# Patient Record
Sex: FEMALE | Race: WHITE | NOT HISPANIC OR LATINO | Employment: UNEMPLOYED | URBAN - NONMETROPOLITAN AREA
[De-identification: names, ages, dates, MRNs, and addresses within clinical notes are randomized per-mention and may not be internally consistent; named-entity substitution may affect disease eponyms.]

---

## 2017-02-13 ENCOUNTER — OFFICE VISIT - GICH (OUTPATIENT)
Dept: FAMILY MEDICINE | Facility: OTHER | Age: 47
End: 2017-02-13

## 2017-02-13 DIAGNOSIS — I10 ESSENTIAL (PRIMARY) HYPERTENSION: ICD-10-CM

## 2017-02-13 DIAGNOSIS — N94.9 UNSPECIFIED CONDITION ASSOCIATED WITH FEMALE GENITAL ORGANS AND MENSTRUAL CYCLE (CODE): ICD-10-CM

## 2017-07-19 ENCOUNTER — AMBULATORY - GICH (OUTPATIENT)
Dept: FAMILY MEDICINE | Facility: OTHER | Age: 47
End: 2017-07-19

## 2017-07-19 DIAGNOSIS — Z11.1 ENCOUNTER FOR SCREENING FOR RESPIRATORY TUBERCULOSIS: ICD-10-CM

## 2017-07-21 ENCOUNTER — AMBULATORY - GICH (OUTPATIENT)
Dept: FAMILY MEDICINE | Facility: OTHER | Age: 47
End: 2017-07-21

## 2017-07-26 ENCOUNTER — AMBULATORY - GICH (OUTPATIENT)
Dept: FAMILY MEDICINE | Facility: OTHER | Age: 47
End: 2017-07-26

## 2017-07-26 DIAGNOSIS — Z11.1 ENCOUNTER FOR SCREENING FOR RESPIRATORY TUBERCULOSIS: ICD-10-CM

## 2017-07-28 ENCOUNTER — AMBULATORY - GICH (OUTPATIENT)
Dept: FAMILY MEDICINE | Facility: OTHER | Age: 47
End: 2017-07-28

## 2017-08-15 ENCOUNTER — HISTORY (OUTPATIENT)
Dept: FAMILY MEDICINE | Facility: OTHER | Age: 47
End: 2017-08-15

## 2017-08-15 ENCOUNTER — OFFICE VISIT - GICH (OUTPATIENT)
Dept: FAMILY MEDICINE | Facility: OTHER | Age: 47
End: 2017-08-15

## 2017-08-15 DIAGNOSIS — I78.1 NEVUS, NON-NEOPLASTIC: ICD-10-CM

## 2017-08-15 DIAGNOSIS — I10 ESSENTIAL (PRIMARY) HYPERTENSION: ICD-10-CM

## 2017-08-23 ENCOUNTER — HISTORY (OUTPATIENT)
Dept: FAMILY MEDICINE | Facility: OTHER | Age: 47
End: 2017-08-23

## 2017-08-23 ENCOUNTER — OFFICE VISIT - GICH (OUTPATIENT)
Dept: FAMILY MEDICINE | Facility: OTHER | Age: 47
End: 2017-08-23

## 2017-08-23 DIAGNOSIS — N90.9 NONINFLAMMATORY DISORDER OF VULVA AND PERINEUM: ICD-10-CM

## 2017-10-02 ENCOUNTER — AMBULATORY - GICH (OUTPATIENT)
Dept: FAMILY MEDICINE | Facility: OTHER | Age: 47
End: 2017-10-02

## 2017-10-02 DIAGNOSIS — Z23 ENCOUNTER FOR IMMUNIZATION: ICD-10-CM

## 2017-12-27 NOTE — PROGRESS NOTES
"Patient Information     Patient Name MRN Geoff Wall [id number] Female 1970      Progress Notes by Alana Shrestha MD at 2017 10:15 AM     Author:  Alana Shrestha MD Service:  (none) Author Type:  Physician     Filed:  2017 10:40 AM Encounter Date:  2017 Status:  Signed     :  Alana Shrestha MD (Physician)            SUBJECTIVE:   Geoff Craven is a 47 y.o. female who would like a labial area cyst or lesion examined. Had one small one and may be a bit larger and now thinks there is one on both sides.  This area does get irritated on thong type underwear which she has been doing. She has also recently been working out quite a bit doing running and jogging, bike riding which is also going to be more irritating to the genital area. She does not shave the area. Last sexual encounter was 2 years ago and uses a condom. 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    OBJECTIVE:   /80  Pulse 78  Ht 1.63 m (5' 4.17\")  Wt 55.3 kg (122 lb)  LMP 2017  BMI 20.83 kg/m2  Appears well, alert, oriented, pleasant and cooperative.   External genital area reveals normal labia. The area she is questioning has only some hair follicles and no cysts or ulcers. She has one area that's inflamed and is likely an abraded hair follicle which was rubbed by her clothing and is about 3 mm in size.    ASSESSMENT:  1. Irritation of external female genitalia          PLAN:   Use topical Neosporin pain ointment twice daily to the area that's abraded on the left side.  Sitz baths recommended.  Stop wearing thong underwear which is causing inflammation and irritation in this area.  Reassured that there is nothing abnormal about her external genitalia.  Alana Shrestha MD  10:39 AM 2017             "

## 2017-12-28 NOTE — PATIENT INSTRUCTIONS
Patient Information     Patient Name MRN Geoff Wall [id number] Female 1970      Patient Instructions by Alana Shrestha MD at 8/15/2017  4:00 PM     Author:  Alana Shrestha MD Service:  (none) Author Type:  Physician     Filed:  8/15/2017  4:14 PM Encounter Date:  8/15/2017 Status:  Signed     :  Alana Shrestha MD (Physician)            Continue to check blood pressures as you come off of the medication.

## 2017-12-28 NOTE — PROGRESS NOTES
"Patient Information     Patient Name MRN Sex Geoff Aceves [id number] Female 1970      Progress Notes by Alana Shrestha MD at 8/15/2017  4:00 PM     Author:  Alana Shrestha MD Service:  (none) Author Type:  Physician     Filed:  8/15/2017  4:28 PM Encounter Date:  8/15/2017 Status:  Signed     :  Alana Shrestha MD (Physician)            Subjective:   Geoff Craven is a 47 y.o. female with hypertension who has now lost 20# and exercising regularly and avoiding refined sugar. She has tapered down on her BP medication to 25 mg a day.  She would like to trial off the medication. 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     Also has a skin lesion on left inner, upper leg is slightly vascular and she tends to shave it off with her razor when she is shaving her legs. She would like it removed if possible. She has no other vascular lesions.    Current Outpatient Prescriptions       Medication  Sig Dispense Refill     metoprolol tartrate (LOPRESSOR) 50 mg tablet Take 1 tablet by mouth 2 times daily. 90 tablet 4     miscellaneous medical supply misc As directed. OMRON or equivalent blood pressure cuff/arm to fit arm 1 Units 0     No current facility-administered medications for this visit.      Medications have been reviewed by me and are current to the best of my knowledge and ability.     Hypertension ROS: taking medications as instructed, no medication side effects noted, no TIA's, no chest pain on exertion, no dyspnea on exertion, no swelling of ankles.     Objective:   /74  Pulse 64  Ht 1.63 m (5' 4.17\")  Wt 57.7 kg (127 lb 4 oz)  LMP 2017  Breastfeeding? No  BMI 21.73 kg/m2   Appearance healthy, alert and cooperative.  General exam BP noted to be well controlled today in office.   2 mm lesion left upper inner leg is a cherry angioma.  Lab review: labs are reviewed, up to date and normal.     Assessment:    1. Hypertension    2. 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 Follow up if not controlled.  Continue diet changes and exercise.  Reassured about skin lesion, difficult to eradicate and benign.  Alana Shrestha MD  4:28 PM 8/15/2017

## 2017-12-28 NOTE — PROGRESS NOTES
Patient Information     Patient Name MRN Geoff Wall [id number] Female 1970      Progress Notes by Lizzy Sánchez RN at 2017 10:16 AM     Author:  Lizzy Sánchez RN Service:  (none) Author Type:  NURS- Registered Nurse     Filed:  2017 10:23 AM Encounter Date:  2017 Status:  Signed     :  Lizzy Sánchez RN (NURS- Registered Nurse)              Mantoux test applied for the following purpose: School request    Do NOT apply PPD if patient answers 'yes' to any of the questions below.  Contact their Primary Care Provider for further instructions.       Patient History  Previous TB: no  Previous positive PPD: no  History of 'live' vaccines such as MMRV or Proquad (Measles, Mumps, Rubella, Varicella), Shingles (Zostavax), Intranasal flu (FLumist, LAIV), Rotavirus (Rotateq) in the last 4 weeks: no  HIV or immunocompromised: no  Significant weight loss (10 lbs or greater) in the past 6 months: yes chose to loose wait  Currently or recently experiencing night sweats: no  Currently experiencing a persistent cough: no    The patient has been informed to return to this clinic within 48-72 hours to read PPD.  Above information obtained by: Eddie BELTRAN

## 2017-12-28 NOTE — PROGRESS NOTES
Patient Information     Patient Name MRN Geoff Wall [id number] Female 1970      Progress Notes by Shefali Murphy at 2017  1:08 PM     Author:  Shefali Murphy Service:  (none) Author Type:  NURS- Student Nurse     Filed:  2017  1:14 PM Encounter Date:  2017 Status:  Signed     :  Shefali Murphy (NURS- Student Nurse)              Mantoux test applied for the following purpose: School request    Do NOT apply PPD if patient answers 'yes' to any of the questions below.  Contact their Primary Care Provider for further instructions.       Patient History  Previous TB: no  Previous positive PPD: no  History of 'live' vaccines such as MMRV or Proquad (Measles, Mumps, Rubella, Varicella), Shingles (Zostavax), Intranasal flu (FLumist, LAIV), Rotavirus (Rotateq) in the last 4 weeks: no  HIV or immunocompromised: no  Significant weight loss (10 lbs or greater) in the past 6 months: yes, by choice  Currently or recently experiencing night sweats: no  Currently experiencing a persistent cough: no    The patient has been informed to return to this clinic within 48-72 hours to read PPD.  Above information obtained by: Shefali Murphy RN ....................  2017   1:11 PM

## 2017-12-29 NOTE — PATIENT INSTRUCTIONS
Patient Information     Patient Name MRN Geoff Wall [id number] Female 1970      Patient Instructions by Alana Shrestha MD at 2017 10:15 AM     Author:  Alana Shrestha MD Service:  (none) Author Type:  Physician     Filed:  2017 10:33 AM Encounter Date:  2017 Status:  Signed     :  Alana Shrestha MD (Physician)            Apply neosporin pain ointment or cream to area twice a day.   Take a bath intermittently.

## 2017-12-30 NOTE — NURSING NOTE
Patient Information     Patient Name MRN Geoff Wall [id number] Female 1970      Nursing Note by Shefali Murphy at 2017 11:00 AM     Author:  Shefali Murphy Service:  (none) Author Type:  NURS- Student Nurse     Filed:  2017 11:10 AM Encounter Date:  2017 Status:  Signed     :  Shefali Murphy (NURS- Student Nurse)            Mantoux placed on 17 to the left forearm at 1020 AM. Read today on 17 0mm, negative. Read at 1109 AM. Shefali Murphy RN ....................  2017   11:09 AM

## 2017-12-30 NOTE — NURSING NOTE
Patient Information     Patient Name MRN Geoff Wall [id number] Female 1970      Nursing Note by Minnie Hill at 8/15/2017  4:00 PM     Author:  Minnie Hill Service:  (none) Author Type:  (none)     Filed:  8/15/2017  4:06 PM Encounter Date:  8/15/2017 Status:  Signed     :  Minnie Hill            Patient presents today to discuss bp medication. Minnie Hill LPN...............8/15/2017   4:00 PM

## 2017-12-30 NOTE — NURSING NOTE
Patient Information     Patient Name MRN Geoff Wall [id number] Female 1970      Nursing Note by Deann Lamb RN at 2017  1:45 PM     Author:  Deann Lamb RN Service:  (none) Author Type:  NURS- Registered Nurse     Filed:  2017  1:58 PM Encounter Date:  2017 Status:  Signed     :  Deann Lamb RN (NURS- Registered Nurse)            t had a mantoux placed on 17 at 110 pm in left forearm and returns today 17 at 155 pm to have this read as neg 0.0 mm. Copy of this given on request. DEANN LAMB RN ....................  2017   1:58 PM

## 2018-01-03 NOTE — PATIENT INSTRUCTIONS
Patient Information     Patient Name MRN Geoff Wall [id number] Female 1970      Patient Instructions by Alana Shrestha MD at 2017  2:00 PM     Author:  Alana Shrestha MD Service:  (none) Author Type:  Physician     Filed:  2017  2:18 PM Encounter Date:  2017 Status:  Signed     :  Alana Shrestha MD (Physician)            Use Neosporin pain to area 2-3 times a day until healed.

## 2018-01-25 VITALS
WEIGHT: 127.25 LBS | BODY MASS INDEX: 21.72 KG/M2 | SYSTOLIC BLOOD PRESSURE: 132 MMHG | DIASTOLIC BLOOD PRESSURE: 74 MMHG | HEART RATE: 64 BPM | HEIGHT: 64 IN

## 2018-01-25 VITALS
SYSTOLIC BLOOD PRESSURE: 140 MMHG | BODY MASS INDEX: 25.78 KG/M2 | DIASTOLIC BLOOD PRESSURE: 90 MMHG | HEIGHT: 64 IN | WEIGHT: 151 LBS | HEART RATE: 62 BPM

## 2018-01-25 VITALS
DIASTOLIC BLOOD PRESSURE: 80 MMHG | HEIGHT: 64 IN | BODY MASS INDEX: 20.83 KG/M2 | WEIGHT: 122 LBS | SYSTOLIC BLOOD PRESSURE: 130 MMHG | HEART RATE: 78 BPM

## 2018-02-25 ENCOUNTER — DOCUMENTATION ONLY (OUTPATIENT)
Dept: FAMILY MEDICINE | Facility: OTHER | Age: 48
End: 2018-02-25

## 2018-02-25 PROBLEM — J30.1 ALLERGIC RHINITIS DUE TO POLLEN: Status: ACTIVE | Noted: 2018-02-25

## 2018-02-25 RX ORDER — BLOOD PRESSURE TEST KIT-WRIST
KIT MISCELLANEOUS
COMMUNITY
Start: 2016-10-20

## 2018-04-06 ENCOUNTER — OFFICE VISIT (OUTPATIENT)
Dept: OBGYN | Facility: OTHER | Age: 48
End: 2018-04-06
Attending: OBSTETRICS & GYNECOLOGY
Payer: COMMERCIAL

## 2018-04-06 VITALS
SYSTOLIC BLOOD PRESSURE: 160 MMHG | HEART RATE: 62 BPM | DIASTOLIC BLOOD PRESSURE: 80 MMHG | WEIGHT: 132.4 LBS | BODY MASS INDEX: 22.61 KG/M2 | HEIGHT: 64 IN

## 2018-04-06 DIAGNOSIS — N90.89 LABIAL LESION: Primary | ICD-10-CM

## 2018-04-06 PROCEDURE — 99203 OFFICE O/P NEW LOW 30 MIN: CPT | Performed by: OBSTETRICS & GYNECOLOGY

## 2018-04-06 PROCEDURE — G0463 HOSPITAL OUTPT CLINIC VISIT: HCPCS

## 2018-04-06 ASSESSMENT — PAIN SCALES - GENERAL: PAINLEVEL: NO PAIN (0)

## 2018-04-06 NOTE — MR AVS SNAPSHOT
"              After Visit Summary   2018    Geoff Craven    MRN: [id number]           Patient Information     Date Of Birth          1970        Visit Information        Provider Department      2018 1:45 PM Nanci Zheng MD [facility]        Today's Diagnoses     Labial lesion    -  1       Follow-ups after your visit        Who to contact     If you have questions or need follow up information about today's clinic visit or your schedule please contact [facility] directly at [phone].  Normal or non-critical lab and imaging results will be communicated to you by JumpTimehart, letter or phone within 4 business days after the clinic has received the results. If you do not hear from us within 7 days, please contact the clinic through Fluidigmt or phone. If you have a critical or abnormal lab result, we will notify you by phone as soon as possible.  Submit refill requests through Pryv or call your pharmacy and they will forward the refill request to us. Please allow 3 business days for your refill to be completed.          Additional Information About Your Visit        MyChart Information     Pryv lets you send messages to your doctor, view your test results, renew your prescriptions, schedule appointments and more. To sign up, go to [email or web address] . Click on \"Log in\" on the left side of the screen, which will take you to the Welcome page. Then click on \"Sign up Now\" on the right side of the page.     You will be asked to enter the access code listed below, as well as some personal information. Please follow the directions to create your username and password.     Your access code is: [email or web address]  Expires: 2018  4:47 PM     Your access code will  in 90 days. If you need help or a new code, please call your [place] clinic or [phone].        Care EveryWhere ID     This is your Care EveryWhere ID. This could be used by other " "organizations to access your [facility] medical records  [id number]        Your Vitals Were     Pulse Height Last Period Breastfeeding? BMI (Body Mass Index)       62 1.626 m (5' 4\") 03/08/2018 (Approximate) No 22.73 kg/m2        Blood Pressure from Last 3 Encounters:   04/06/18 160/80   08/23/17 130/80   08/15/17 132/74    Weight from Last 3 Encounters:   04/06/18 60.1 kg (132 lb 6.4 oz)   08/23/17 55.3 kg (122 lb)   08/15/17 57.7 kg (127 lb 4 oz)              Today, you had the following     No orders found for display       Primary Care Provider Office Phone # Fax #    Alana Shrestha [phone]       [address] [facility] [address]        Equal Access to Services     [facility]: Hadii olinda marroquin Sozen, waaxda luqadaha, qaybta kaalmada adekobeyada, elbert adan . So [facility] [phone].    ATENCIÓN: Si habla español, tiene a dexter disposición servicios gratuitos de asistencia lingüística. Llame al [phone].    We comply with applicable federal civil rights laws and [place] laws. We do not discriminate on the basis of race, color, national origin, age, disability, sex, sexual orientation, or gender identity.            Thank you!     Thank you for choosing [facility] AND [facility]  for your care. Our goal is always to provide you with excellent care. Hearing back from our patients is one way we can continue to improve our services. Please take a few minutes to complete the written survey that you may receive in the mail after your visit with us. Thank you!             Your Updated Medication List - Protect others around you: Learn how to safely use, store and throw away your medicines at www.disposemymeds.org.          This list is accurate as of 4/6/18  4:47 PM.  Always use your most recent med list.                   Brand Name Dispense Instructions for use Diagnosis    OMRON 7 SERIES BP MONITOR Bela      As directed. OMRON or " equivalent blood pressure cuff/arm to fit arm

## 2018-04-06 NOTE — NURSING NOTE
Patient has sore area in left labia/groin area that comes and goes. Not present today.    Rosemarie Allen RN...................4/6/2018 1:53 PM

## 2018-04-06 NOTE — LETTER
4/6/2018       RE: Geoff Craven  [address]  [place] [address]     Dear Colleague,    Thank you for referring your patient, Geoff Craven, to the [facility] at [facility]. Please see a copy of my visit note below.    Gynecology Visit    CC: labial sore    HPI:    Geoff Craven is a 47 year old G0 here for recurrent sores on her labia. She reports 4 total flares in the past 6 months of a raised, extremely painful lesion on the medial aspect of her left labia.  It always recurs in the same location. It will last 4-5 days and spontaneously resolve. It does not look like a blister or ulcer. It is not erythematous and does not drain. She has not been sexually active for the past two years and has never been diagnosed with HSV. 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 Had a cone biopsy and reports all normal paps since. Last 6/2016 NILM.    PMHx:   Past Medical History:   Diagnosis Date     Bronchitis     No Comments Provided     Personal history of other medical treatment (CODE)     1991,status post cone biopsy.     Pneumonia     No Comments Provided      PSHx:   Past Surgical History:   Procedure Laterality Date     EXTRACTION(S) DENTAL      Only had one     OTHER SURGICAL HISTORY      1991,10885.0,[place] CONIZATION CERVIX KNIFE/LASER      Meds:   Current Outpatient Prescriptions on File Prior to Visit:  Blood Pressure Monitoring (OMRON 7 SERIES BP MONITOR) GRETCHEN As directed. OMRON or equivalent blood pressure cuff/arm to fit arm     No current facility-administered medications on file prior to visit.     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 Graduates in  "May    FamHx:   Family History   Problem Relation Age of Onset     Breast Cancer Other      Cancer-breast     DIABETES Other      Diabetes     Other - See Comments Other      Psychiatric illness, anxiety     Colon Cancer Maternal Aunt      Cancer-colon,In her 50's     DIABETES Father      Diabetes     Hyperlipidemia Father      Hyperlipidemia,High cholesterol     HEART DISEASE Other      Heart Disease,Several grandparents, unknown type     Hyperlipidemia Mother      Hyperlipidemia      Physical Exam  /80 (BP Location: Right arm, Patient Position: Sitting, Cuff Size: Adult Regular)  Pulse 62  Ht 1.626 m (5' 4\")  Wt 60.1 kg (132 lb 6.4 oz)  LMP 03/08/2018 (Approximate)  Breastfeeding? No  BMI 22.73 kg/m2  Gen: Well-appearing, NAD  Resp: nonlabored  Psych: appropriate mood and affect    Pelvic:  Normal appearing external female genitalia. Normal hair distribution. There are no abnormal or raised lesions on the inner aspect of her labia. Outlined area of discomfort is at 5-6 o'clock of posterior forchette.      Assessment/Plan  Geoff Craven is a 47 year old G0 female here for recurrent labia sores. She brought in a picture today- story sounds consistent with possible HSV but images on phone do not look as convincing. It is not ulcerated as would be expected with Hemophilus ducreyi or syphilis. Location is not consistent with a bartholin cyst. Recommend she RTC when she is having symptoms so an exam can be done. She was instructed to call at onset of symptoms so she can be worked in for a same or next day appointment.    30 minutes were spent with the patient with >50% spent counseling on vuvlar lesions.      Nanci Zheng MD  OB/GYN  4/6/2018 4:11 PM      "

## 2018-04-06 NOTE — PROGRESS NOTES
Gynecology Visit    CC: labial sore    HPI:    Geoff Craven is a 47 year old G0 here for recurrent sores on her labia. She reports 4 total flares in the past 6 months of a raised, extremely painful lesion on the medial aspect of her left labia.  It always recurs in the same location. It will last 4-5 days and spontaneously resolve. It does not look like a blister or ulcer. It is not erythematous and does not drain. She has not been sexually active for the past two years and has never been diagnosed with HSV. 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 Had a cone biopsy and reports all normal paps since. Last 6/2016 NILM.    PMHx:   Past Medical History:   Diagnosis Date     Bronchitis     No Comments Provided     Personal history of other medical treatment (CODE)     1991,status post cone biopsy.     Pneumonia     No Comments Provided      PSHx:   Past Surgical History:   Procedure Laterality Date     EXTRACTION(S) DENTAL      Only had one     OTHER SURGICAL HISTORY      1991,50398.0,[place] CONIZATION CERVIX KNIFE/LASER      Meds:   Current Outpatient Prescriptions on File Prior to Visit:  Blood Pressure Monitoring (OMRON 7 SERIES BP MONITOR) GRETCHEN As directed. OMRON or equivalent blood pressure cuff/arm to fit arm     No current facility-administered medications on file prior to visit.     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    FamHx:   Family History   Problem Relation Age of Onset     Breast Cancer Other      Cancer-breast     DIABETES Other      Diabetes     Other - See Comments Other      Psychiatric illness, anxiety     Colon Cancer Maternal Aunt      Cancer-colon,In her 50's      "DIABETES Father      Diabetes     Hyperlipidemia Father      Hyperlipidemia,High cholesterol     HEART DISEASE Other      Heart Disease,Several grandparents, unknown type     Hyperlipidemia Mother      Hyperlipidemia      Physical Exam  /80 (BP Location: Right arm, Patient Position: Sitting, Cuff Size: Adult Regular)  Pulse 62  Ht 1.626 m (5' 4\")  Wt 60.1 kg (132 lb 6.4 oz)  LMP 03/08/2018 (Approximate)  Breastfeeding? No  BMI 22.73 kg/m2  Gen: Well-appearing, NAD  Resp: nonlabored  Psych: appropriate mood and affect    Pelvic:  Normal appearing external female genitalia. Normal hair distribution. There are no abnormal or raised lesions on the inner aspect of her labia. Outlined area of discomfort is at 5-6 o'clock of posterior forchette.      Assessment/Plan  Geoff Craven is a 47 year old G0 female here for recurrent labia sores. She brought in a picture today- story sounds consistent with possible HSV but images on phone do not look as convincing. It is not ulcerated as would be expected with Hemophilus ducreyi or syphilis. Location is not consistent with a bartholin cyst. Recommend she RTC when she is having symptoms so an exam can be done. She was instructed to call at onset of symptoms so she can be worked in for a same or next day appointment.    30 minutes were spent with the patient with >50% spent counseling on vuvlar lesions.      Nanci Zheng MD  OB/GYN  4/6/2018 4:11 PM      "

## 2018-04-18 ENCOUNTER — OFFICE VISIT (OUTPATIENT)
Dept: OBGYN | Facility: OTHER | Age: 48
End: 2018-04-18
Attending: OBSTETRICS & GYNECOLOGY
Payer: COMMERCIAL

## 2018-04-18 VITALS
HEART RATE: 60 BPM | SYSTOLIC BLOOD PRESSURE: 130 MMHG | WEIGHT: 132.9 LBS | DIASTOLIC BLOOD PRESSURE: 84 MMHG | BODY MASS INDEX: 22.81 KG/M2

## 2018-04-18 DIAGNOSIS — N90.89 VULVAR LESION: Primary | ICD-10-CM

## 2018-04-18 PROCEDURE — 99213 OFFICE O/P EST LOW 20 MIN: CPT | Performed by: OBSTETRICS & GYNECOLOGY

## 2018-04-18 PROCEDURE — G0463 HOSPITAL OUTPT CLINIC VISIT: HCPCS

## 2018-04-18 PROCEDURE — 87529 HSV DNA AMP PROBE: CPT | Performed by: OBSTETRICS & GYNECOLOGY

## 2018-04-18 RX ORDER — ACYCLOVIR 400 MG/1
400 TABLET ORAL 3 TIMES DAILY
Qty: 15 TABLET | Refills: 0 | Status: SHIPPED | OUTPATIENT
Start: 2018-04-18

## 2018-04-18 ASSESSMENT — PAIN SCALES - GENERAL: PAINLEVEL: SEVERE PAIN (6)

## 2018-04-18 NOTE — NURSING NOTE
Patient presents for follow up of labial lesion.    Rosemarie Allen RN...................4/18/2018 9:56 AM

## 2018-04-18 NOTE — PROGRESS NOTES
Follow-Up Visit    S: Ms. Geoff Craven is a 47 year old here for f/u of vulvar lesions. She reports two days ago she started feeling some pressure on her perineum in the area of the irritation. It became more painful yesterday and is worse today. She looked at that area and thinks there is some skin breakdown. She has noticed a pattern that she seems to get these symptoms after her menses.    O:  /84 (BP Location: Right arm, Patient Position: Sitting, Cuff Size: Adult Regular)  Pulse 60  Wt 60.3 kg (132 lb 14.4 oz)  LMP 04/12/2018 (Exact Date)  Breastfeeding? No  BMI 22.81 kg/m2  Gen: Well-appearing, NAD  Pulm: nonlabored  Psych: appropriate mood and affect    Pelvic: Two small (2-3mm) superficial tender ulcerated lesions on the left perineum just posterior to the posterior forchette.    A/P:  Ms. Geoff Craven is a 47 year old  here for recurrent vulvar lesions. Lesions are not classic for herpes but suspicious, especially given her story. HSV swab collected today. Will call patient with results. Will do trial of acyclovir and topical lidocaine gel. Given frequency of symptoms, if HSV is confirmed would recommend suppressive therapy    Nanci Zheng MD  OB/GYN  4/18/2018 11:42 AM

## 2018-04-18 NOTE — MR AVS SNAPSHOT
"              After Visit Summary   2018    Geoff Craven    MRN: [id number]           Patient Information     Date Of Birth          1970        Visit Information        Provider Department      2018 10:00 AM Nanci Zheng MD [facility]        Today's Diagnoses     Vulvar lesion    -  1       Follow-ups after your visit        Who to contact     If you have questions or need follow up information about today's clinic visit or your schedule please contact [facility] directly at [phone].  Normal or non-critical lab and imaging results will be communicated to you by Lozohart, letter or phone within 4 business days after the clinic has received the results. If you do not hear from us within 7 days, please contact the clinic through Neurosearcht or phone. If you have a critical or abnormal lab result, we will notify you by phone as soon as possible.  Submit refill requests through YouFolio or call your pharmacy and they will forward the refill request to us. Please allow 3 business days for your refill to be completed.          Additional Information About Your Visit        MyChart Information     YouFolio lets you send messages to your doctor, view your test results, renew your prescriptions, schedule appointments and more. To sign up, go to [email or web address] . Click on \"Log in\" on the left side of the screen, which will take you to the Welcome page. Then click on \"Sign up Now\" on the right side of the page.     You will be asked to enter the access code listed below, as well as some personal information. Please follow the directions to create your username and password.     Your access code is: [email or web address]  Expires: 2018  4:47 PM     Your access code will  in 90 days. If you need help or a new code, please call your [place] clinic or [phone].        Care EveryWhere ID     This is your Care EveryWhere ID. This could be used by other " organizations to access your [facility] medical records  [id number]        Your Vitals Were     Pulse Last Period Breastfeeding? BMI (Body Mass Index)          60 04/12/2018 (Exact Date) No 22.81 kg/m2         Blood Pressure from Last 3 Encounters:   04/18/18 130/84   04/06/18 160/80   08/23/17 130/80    Weight from Last 3 Encounters:   04/18/18 60.3 kg (132 lb 14.4 oz)   04/06/18 60.1 kg (132 lb 6.4 oz)   08/23/17 55.3 kg (122 lb)              We Performed the Following     Herpes Simplex Virus 1 and 2 IgG     HSV 1 and 2 DNA by PCR          Today's Medication Changes          These changes are accurate as of 4/18/18 11:47 AM.  If you have any questions, ask your nurse or doctor.               Start taking these medicines.        Dose/Directions    acyclovir 400 MG tablet   Commonly known as:  ZOVIRAX   Used for:  Vulvar lesion   Started by:  Nanci Zheng MD        Dose:  400 mg   Take 1 tablet (400 mg) by mouth 3 times daily   Quantity:  15 tablet   Refills:  0       lidocaine 2 % topical gel   Commonly known as:  XYLOCAINE   Used for:  Vulvar lesion   Started by:  Nanci Zheng MD        Apply topically as needed for moderate pain   Quantity:  30 mL   Refills:  0            Where to get your medicines      These medications were sent to [facility] - [place], [place] - [address]  [address], [facility] [address]     Phone:  [phone]     acyclovir 400 MG tablet    lidocaine 2 % topical gel                Primary Care Provider Office Phone # Fax #    Aalna Lissette Shrestha [phone]       [address] [facility] [address]        Equal Access to Services     [facility] Hospital[facility]ARCEL AH: Hadii olinda Rosales, wajhonathanda luqadaha, qaybta kaalmada adeegyada, elbert mendez. So [facility] [phone].    ATENCIÓN: Si habla español, tiene a dexter disposición servicios gratuitos de asistencia lingüística. Llame al  [phone].    We comply with applicable federal civil rights laws and [place] laws. We do not discriminate on the basis of race, color, national origin, age, disability, sex, sexual orientation, or gender identity.            Thank you!     Thank you for choosing [facility] AND [facility]  for your care. Our goal is always to provide you with excellent care. Hearing back from our patients is one way we can continue to improve our services. Please take a few minutes to complete the written survey that you may receive in the mail after your visit with us. Thank you!             Your Updated Medication List - Protect others around you: Learn how to safely use, store and throw away your medicines at www.disposemymeds.org.          This list is accurate as of 4/18/18 11:47 AM.  Always use your most recent med list.                   Brand Name Dispense Instructions for use Diagnosis    acyclovir 400 MG tablet    ZOVIRAX    15 tablet    Take 1 tablet (400 mg) by mouth 3 times daily    Vulvar lesion       lidocaine 2 % topical gel    XYLOCAINE    30 mL    Apply topically as needed for moderate pain    Vulvar lesion       OMRON 7 SERIES BP MONITOR Bela      As directed. OMRON or equivalent blood pressure cuff/arm to fit arm

## 2018-04-18 NOTE — LETTER
4/18/2018       RE: Geoff Craven  [address]  [facility] [address]     Dear Colleague,    Thank you for referring your patient, Geoff Craven, to the [facility]. Please see a copy of my visit note below.    Follow-Up Visit    S: Ms. Geoff Craven is a 47 year old here for f/u of vulvar lesions. She reports two days ago she started feeling some pressure on her perineum in the area of the irritation. It became more painful yesterday and is worse today. She looked at that area and thinks there is some skin breakdown. She has noticed a pattern that she seems to get these symptoms after her menses.    O:  /84 (BP Location: Right arm, Patient Position: Sitting, Cuff Size: Adult Regular)  Pulse 60  Wt 60.3 kg (132 lb 14.4 oz)  LMP 04/12/2018 (Exact Date)  Breastfeeding? No  BMI 22.81 kg/m2  Gen: Well-appearing, NAD  Pulm: nonlabored  Psych: appropriate mood and affect    Pelvic: Two small (2-3mm) superficial tender ulcerated lesions on the left perineum just posterior to the posterior forchette.    A/P:  Ms. Geoff Craven is a 47 year old  here for recurrent vulvar lesions. Lesions are not classic for herpes but suspicious, especially given her story. HSV swab collected today. Will call patient with results. Will do trial of acyclovir and topical lidocaine gel. Given frequency of symptoms, if HSV is confirmed would recommend suppressive therapy    Nanci Zheng MD  OB/GYN  4/18/2018 11:42 AM

## 2018-04-19 LAB
HSV1 DNA SPEC QL NAA+PROBE: NEGATIVE
HSV2 DNA SPEC QL NAA+PROBE: POSITIVE
SPECIMEN SOURCE: ABNORMAL

## 2018-04-20 ENCOUNTER — TELEPHONE (OUTPATIENT)
Dept: OBGYN | Facility: OTHER | Age: 48
End: 2018-04-20

## 2018-04-20 DIAGNOSIS — B00.9 HSV (HERPES SIMPLEX VIRUS) INFECTION: Primary | ICD-10-CM

## 2018-04-20 RX ORDER — VALACYCLOVIR HYDROCHLORIDE 500 MG/1
500 TABLET, FILM COATED ORAL DAILY
Qty: 90 TABLET | Refills: 3 | Status: SHIPPED | OUTPATIENT
Start: 2018-04-20

## 2018-04-20 NOTE — TELEPHONE ENCOUNTER
Patient notified of HSV swab results and suppressive therapy ordered per verbal order from Dr. Nanci Zheng MD.    Rosemarie Allen RN...................4/20/2018 10:22 AM

## 2018-08-02 ENCOUNTER — OFFICE VISIT (OUTPATIENT)
Dept: FAMILY MEDICINE | Facility: OTHER | Age: 48
End: 2018-08-02
Attending: FAMILY MEDICINE
Payer: COMMERCIAL

## 2018-08-02 VITALS
SYSTOLIC BLOOD PRESSURE: 142 MMHG | TEMPERATURE: 97.4 F | HEART RATE: 68 BPM | BODY MASS INDEX: 22.31 KG/M2 | WEIGHT: 130 LBS | DIASTOLIC BLOOD PRESSURE: 78 MMHG

## 2018-08-02 DIAGNOSIS — B07.8 COMMON WART: Primary | ICD-10-CM

## 2018-08-02 PROBLEM — I10 ESSENTIAL HYPERTENSION: Status: ACTIVE | Noted: 2018-08-02

## 2018-08-02 PROCEDURE — 17110 DESTRUCTION B9 LES UP TO 14: CPT

## 2018-08-02 PROCEDURE — G0463 HOSPITAL OUTPT CLINIC VISIT: HCPCS

## 2018-08-02 PROCEDURE — 17110 DESTRUCTION B9 LES UP TO 14: CPT | Performed by: FAMILY MEDICINE

## 2018-08-02 NOTE — PATIENT INSTRUCTIONS
Treating Warts     You and your healthcare provider can discuss whether your warts need to be treated.     You and your healthcare provider can talk about what treatment may be best for your wart or warts. To get rid of your warts, your healthcare provider may need to try more than one type of treatment. The methods described below are often used to treat warts.  Types of treatment    Do nothing. Most warts will resolve within 2 years, even without treatment. So doing nothing is sometimes a good option. This is particularly true for smaller warts that are not causing symptoms.    Cryotherapy (liquid nitrogen). This kills skin cells by freezing them. It kills the warts and destroys skin infected by the wart-causing virus. This is done in your healthcare provider s office and will cause some discomfort. It may take several treatments over several weeks to get rid of the warts.    Topical medicines. Prescribed topical medicines can be put on the skin. These are usually applied in the healthcare provider's office. But some prescriptions may be applied at home.    Over-the-counter (OTC) topical treatments. OTC medicines that most often contain salicylic acid may be an option. These patches, liquids, and creams are used at home. The medicine is applied daily to the wart and nearby skin. It's usually left on overnight. The dead skin is filed down the next day. In 1 to 3 days, the procedure can be repeated. Topical treatments are sometimes combined with cryotherapy.    Electrodessication and curettage (ED & C).  For this procedure, the healthcare provider applies numbing medicine to the wart. Then the wart is scraped or cut off. This type of treatment is usually not the first line of therapy.    Laser surgery.  This can vaporize wart tissue or destroy the blood vessels that feed the wart. This is done in the healthcare provider's office.    Shots (injections). These can be used to treat warts that don t respond to other  treatments, such as stubborn or painful warts around the nails. This is done in the healthcare provider s office.  When to seek medical treatment  It s a good idea to have your healthcare provider check your warts. That way your provider can rule out any other skin problems. Sometimes a callous or a corn can look like a wart, but the treatments may differ. Treatment can also provide relief from warts that bleed, burn, hurt, or itch. Genital warts should always be treated. They can spread to other people through sexual contact. And they may cause genital or cervical cancer.  Getting good results  After having your warts treated, new warts may still appear. Don t be discouraged. Warts often come back. See your healthcare provider again to discuss this. Your provider can tell you about the treatments that most likely will help clear your skin of warts.   Date Last Reviewed: 2/1/2017 2000-2017 The [employer]. [address] [place] [address], [place] [address]. All rights reserved. This information is not intended as a substitute for professional medical care. Always follow your healthcare professional's instructions.

## 2018-08-02 NOTE — NURSING NOTE
Patient presents to clinic with wart on right palm of hand that she would like to have removed.  Evi Vargas ....................  8/2/2018   11:36 AM

## 2018-08-02 NOTE — MR AVS SNAPSHOT
After Visit Summary   8/2/2018    Geoff Craven    MRN: [id number]           Patient Information     Date Of Birth          1970        Visit Information        Provider Department      8/2/2018 11:30 AM Alana Shrestha MD [facility]        Today's Diagnoses     Common wart    -  1      Care Instructions      Treating Warts     You and your healthcare provider can discuss whether your warts need to be treated.     You and your healthcare provider can talk about what treatment may be best for your wart or warts. To get rid of your warts, your healthcare provider may need to try more than one type of treatment. The methods described below are often used to treat warts.  Types of treatment    Do nothing. Most warts will resolve within 2 years, even without treatment. So doing nothing is sometimes a good option. This is particularly true for smaller warts that are not causing symptoms.    Cryotherapy (liquid nitrogen). This kills skin cells by freezing them. It kills the warts and destroys skin infected by the wart-causing virus. This is done in your healthcare provider s office and will cause some discomfort. It may take several treatments over several weeks to get rid of the warts.    Topical medicines. Prescribed topical medicines can be put on the skin. These are usually applied in the healthcare provider's office. But some prescriptions may be applied at home.    Over-the-counter (OTC) topical treatments. OTC medicines that most often contain salicylic acid may be an option. These patches, liquids, and creams are used at home. The medicine is applied daily to the wart and nearby skin. It's usually left on overnight. The dead skin is filed down the next day. In 1 to 3 days, the procedure can be repeated. Topical treatments are sometimes combined with cryotherapy.    Electrodessication and curettage (ED & C).  For this procedure, the healthcare provider applies numbing  medicine to the wart. Then the wart is scraped or cut off. This type of treatment is usually not the first line of therapy.    Laser surgery.  This can vaporize wart tissue or destroy the blood vessels that feed the wart. This is done in the healthcare provider's office.    Shots (injections). These can be used to treat warts that don t respond to other treatments, such as stubborn or painful warts around the nails. This is done in the healthcare provider s office.  When to seek medical treatment  It s a good idea to have your healthcare provider check your warts. That way your provider can rule out any other skin problems. Sometimes a callous or a corn can look like a wart, but the treatments may differ. Treatment can also provide relief from warts that bleed, burn, hurt, or itch. Genital warts should always be treated. They can spread to other people through sexual contact. And they may cause genital or cervical cancer.  Getting good results  After having your warts treated, new warts may still appear. Don t be discouraged. Warts often come back. See your healthcare provider again to discuss this. Your provider can tell you about the treatments that most likely will help clear your skin of warts.   Date Last Reviewed: 2/1/2017 2000-2017 The [employer]. [address] [place] [address], [place] [address]. All rights reserved. This information is not intended as a substitute for professional medical care. Always follow your healthcare professional's instructions.                Follow-ups after your visit        Who to contact     If you have questions or need follow up information about today's clinic visit or your schedule please contact [facility] directly at [phone].  Normal or non-critical lab and imaging results will be communicated to you by MyChart, letter or phone within 4 business days after the clinic has received the results. If you do not hear from us within 7 days, please  "contact the clinic through [employer] or phone. If you have a critical or abnormal lab result, we will notify you by phone as soon as possible.  Submit refill requests through [employer] or call your pharmacy and they will forward the refill request to us. Please allow 3 business days for your refill to be completed.          Additional Information About Your Visit        [email or web address] Information     [employer] lets you send messages to your doctor, view your test results, renew your prescriptions, schedule appointments and more. To sign up, go to [email or web address] . Click on \"Log in\" on the left side of the screen, which will take you to the Welcome page. Then click on \"Sign up Now\" on the right side of the page.     You will be asked to enter the access code listed below, as well as some personal information. Please follow the directions to create your username and password.     Your access code is: [email or web address]  Expires: 10/31/2018 11:48 AM     Your access code will  in 90 days. If you need help or a new code, please call your [place] clinic or [phone].        Care EveryWhere ID     This is your Care EveryWhere ID. This could be used by other organizations to access your [facility] [place] medical records  [id number]        Your Vitals Were     Pulse Temperature Last Period Breastfeeding? BMI (Body Mass Index)       68 97.4  F (36.3  C) 2018 No 22.31 kg/m2        Blood Pressure from Last 3 Encounters:   18 142/78   18 130/84   18 160/80    Weight from Last 3 Encounters:   18 130 lb (59 kg)   18 132 lb 14.4 oz (60.3 kg)   18 132 lb 6.4 oz (60.1 kg)              Today, you had the following     No orders found for display       Primary Care Provider Office Phone # Fax #    Alana Shrestha [phone] [email or web address] [facility] [address]        Equal Access to Services     ORI ROCA AH: efren Solis, hipolitoybmacy " elbert roblero lyle adan ah. Cecille [facility] [phone].    ATENCIÓN: Si melvin parr, tiene a dexter disposición servicios gratuitos de asistencia lingüística. Álvaro al [phone].    We comply with applicable federal civil rights laws and [place] laws. We do not discriminate on the basis of race, color, national origin, age, disability, sex, sexual orientation, or gender identity.            Thank you!     Thank you for choosing [facility] AND [facility]  for your care. Our goal is always to provide you with excellent care. Hearing back from our patients is one way we can continue to improve our services. Please take a few minutes to complete the written survey that you may receive in the mail after your visit with us. Thank you!             Your Updated Medication List - Protect others around you: Learn how to safely use, store and throw away your medicines at www.disposemymeds.org.          This list is accurate as of 8/2/18 11:48 AM.  Always use your most recent med list.                   Brand Name Dispense Instructions for use Diagnosis    acyclovir 400 MG tablet    ZOVIRAX    15 tablet    Take 1 tablet (400 mg) by mouth 3 times daily    Vulvar lesion       lidocaine 2 % topical gel    XYLOCAINE    30 mL    Apply topically as needed for moderate pain    Vulvar lesion       OMRON 7 SERIES BP MONITOR Bela      As directed. OMRON or equivalent blood pressure cuff/arm to fit arm        valACYclovir 500 MG tablet    VALTREX    90 tablet    Take 1 tablet (500 mg) by mouth daily    HSV (herpes simplex virus) infection

## 2018-08-02 NOTE — PROGRESS NOTES
Geoff Craven is a 48 year old female has had 1 wart(s) for about 8 months and has salicylic acid pads tried over-the counter anti-wart medications.  There is no history of infection or injury.  This is the patient's first treatment.    Social History     Social History     Marital status: Single     Spouse name: N/A     Number of children: N/A     Years of education: N/A     Occupational History     Not on file.     Social History Main Topics     Smoking status: Never Smoker     Smokeless tobacco: Never Used     Alcohol use No     Drug use: No      Comment: Drug use: No     Sexual activity: Not Currently     Other Topics Concern     Not on file     Social History Narrative    Single. No children.  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  Traveled in the  with family as a child.   Moved to this area to be near parents in 2014 from [place]. 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     Living with family members.  She does not have a living will or organ donation.       O:   /78  Pulse 68  Temp 97.4  F (36.3  C)  Wt 130 lb (59 kg)  LMP 06/26/2018  Breastfeeding? No  BMI 22.31 kg/m2  Palm of right hand with small 3 mm flat wart at base of 4th finger.     A: Common Wart(s).    P:  Each wart was frozen easily two times with liquid nitrogen.  A total of 1 warts are treated today.  The etiology of common warts were discussed.  Geoff will continue to use the over-the-counter medications such as Compound W under occlusion on a nightly  basis.  Warm soapy water soaks and sanding also recommended.  The patient is to return every two weeks until all warts have resolved.  Alana Shrestha MD  11:44 AM 8/2/2018

## 2018-09-24 ENCOUNTER — TELEPHONE (OUTPATIENT)
Dept: OBGYN | Facility: OTHER | Age: 48
End: 2018-09-24

## 2018-09-24 NOTE — TELEPHONE ENCOUNTER
This RN telephoned pt.  Pt states she has relocated to the [facility] and is wanting her Rxs transferred to [facility] pharmacy, specifically [facility].  Advised and instructed pt on process of Rx transferring of having the [facility] pharmacy contact her current pharmacy of [facility] in [place] to have all of her Rxs exported to her new pharmacy.  Pt verbalizes understanding.  Eleanor Bliss RN on 9/24/2018 at 9:00 AM

## 2018-09-27 NOTE — TELEPHONE ENCOUNTER
This RN received a Rx refill [facility]  This RN had spoke with pt on 9-24-18 regarding her request to have her Rxs transferred down to a pharmacy in [place].  This RN telephoned [facility] and informed them pt's Rxs are current and on file at [facility] pharmacy and they will need to contact pt to have her Rxs exported.  This RN telephoned pt.  Left message on machine to call back.  Eleanor Bliss RN on 9/27/2018 at 1:46 PM

## 2020-05-07 NOTE — PROGRESS NOTES
"Patient Information     Patient Name Geoff De La Cruz [id number] Female 1970      Progress Notes by Alana Shrestha MD at 2017  2:00 PM     Author:  Alana Shrestha MD Service:  (none) Author Type:  Physician     Filed:  2017  2:49 PM Encounter Date:  2017 Status:  Signed     :  Alana Shrestha MD (Physician)            SUBJECTIVE:   46 y.o. female complains vagina throbbing and was told at her physical she had an endocervical polyp. NO bleeding. She states she has felt it and it is bigger but after further questioning what she is actually talking about is her external genital area at the perineal body. She reports that about a week or so ago she had a gastrointestinal illness with 3 or 4 days of diarrheal stools. She thinks she may have wiped very hard at the area around the rectal perineal region and sore. She just recently bought some weights so that she is doing this more gently. No bleeding from this area. She has not taken a mirror to look at it because \"it's too hard for me to see@\"     LMP  2017.  Social History     Social History        Marital status:  Single     Spouse name: N/A     Number of children:  0     Years of education:  N/A     Occupational History      Not on file.     Social History Main Topics       Smoking status: Never Smoker     Smokeless tobacco: Never Used     Alcohol use No     Drug use: No     Sexual activity: Not Currently     Other Topics  Concern      Service No     Blood Transfusions No     Caffeine Concern No     Exercise Yes     Seat Belt Yes     Social History Narrative     Single. No children.  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    Traveled in the  with family as a child.     Moved to this area to be near parents in  from [place]. 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       Living with family members.  She does not have a living will or organ " "donation.       OBJECTIVE:   /90  Pulse 62  Ht 1.63 m (5' 4.17\")  Wt 68.5 kg (151 lb)  BMI 25.78 kg/m2    She appears well, afebrile.  Pelvic Exam: Introitus with an ulcerated abraded area at the posterior fourchette. There is no ulcerations or skin lesions. On insertion of speculum the cervix again shows a very small cervical polyp which does not bleed.    ASSESSMENT:   1. Perineal pain in female    2. Hypertension          PLAN:   Reassured that this is not a cyst or abnormality but only an area of abrasion probably from wiping too vigorously. Topical such as Neosporin pain 2-3 times a day and sits baths recommended until this heals. Avoid wiping very vigorously.  Blood pressure still quite high and will increase from 25 to 50 mg of metoprolol. She checks blood pressures outside the clinic.  Follow up prn.  Alana Shrestha MD  2:49 PM 2/13/2017             " n/a